# Patient Record
Sex: MALE | Race: WHITE
[De-identification: names, ages, dates, MRNs, and addresses within clinical notes are randomized per-mention and may not be internally consistent; named-entity substitution may affect disease eponyms.]

---

## 2017-12-26 ENCOUNTER — APPOINTMENT (OUTPATIENT)
Dept: UROLOGY | Facility: CLINIC | Age: 77
End: 2017-12-26

## 2018-04-26 ENCOUNTER — APPOINTMENT (OUTPATIENT)
Dept: UROLOGY | Facility: CLINIC | Age: 78
End: 2018-04-26

## 2018-05-08 ENCOUNTER — APPOINTMENT (OUTPATIENT)
Dept: UROLOGY | Facility: CLINIC | Age: 78
End: 2018-05-08
Payer: MEDICARE

## 2018-05-08 VITALS
SYSTOLIC BLOOD PRESSURE: 110 MMHG | BODY MASS INDEX: 26.63 KG/M2 | DIASTOLIC BLOOD PRESSURE: 68 MMHG | WEIGHT: 186 LBS | HEART RATE: 57 BPM | HEIGHT: 70 IN

## 2018-05-08 PROCEDURE — 99214 OFFICE O/P EST MOD 30 MIN: CPT

## 2019-05-09 ENCOUNTER — APPOINTMENT (OUTPATIENT)
Dept: UROLOGY | Facility: CLINIC | Age: 79
End: 2019-05-09
Payer: MEDICARE

## 2019-05-09 VITALS
SYSTOLIC BLOOD PRESSURE: 111 MMHG | BODY MASS INDEX: 28.92 KG/M2 | TEMPERATURE: 97.9 F | HEIGHT: 70 IN | OXYGEN SATURATION: 96 % | DIASTOLIC BLOOD PRESSURE: 66 MMHG | HEART RATE: 53 BPM | WEIGHT: 202 LBS

## 2019-05-09 PROCEDURE — 99214 OFFICE O/P EST MOD 30 MIN: CPT

## 2019-05-09 NOTE — HISTORY OF PRESENT ILLNESS
[FreeTextEntry1] : The patient is a 78-year-old gentleman who was seen in the office today for the above. His daytime frequency is to stay at 4 times a day with nocturia x0-1. He denies all of the urological and constitutional symptomatology.\par \par His surgical history is updated for a right inguinal orchiectomy on 4/9/19 at [facility] in [facility]. He had pain in the right testicle while he was there and was brought to the OR for an exploration for possible torsion. From the pathology report, it sounds like a frozen section was done and the pathologist thought there may be a teratoma present. The final pathology report did not disclose a teratoma or any other germ cell malignancy.\par His medication history and allergy history are unchanged.

## 2019-05-09 NOTE — PHYSICAL EXAM
[General Appearance - Well Nourished] : well nourished [General Appearance - Well Developed] : well developed [Normal Appearance] : normal appearance [Well Groomed] : well groomed [General Appearance - In No Acute Distress] : no acute distress [Bowel Sounds] : normal bowel sounds [Abdomen Soft] : soft [Abdomen Tenderness] : non-tender [Abdomen Mass (___ Cm)] : no abdominal mass palpated [Costovertebral Angle Tenderness] : no ~M costovertebral angle tenderness [Urethral Meatus] : meatus normal [Penis Abnormality] : normal circumcised penis [Urinary Bladder Findings] : the bladder was normal on palpation [Scrotum] : the scrotum was normal [Epididymis] : the epididymides were normal [Testes Tenderness] : no tenderness of the testes [Testes Mass (___cm)] : there were no testicular masses [Anus Abnormality] : the anus and perineum were normal [Rectal Exam - Rectum] : digital rectal exam was normal [Prostate Tenderness] : the prostate was not tender [No Prostate Nodules] : no prostate nodules [Prostate Size ___ gm] : prostate size [unfilled] gm [Edema] : no peripheral edema [] : no respiratory distress [FreeTextEntry1] : S1-S2 normal without murmur or gallop [Respiration, Rhythm And Depth] : normal respiratory rhythm and effort [Exaggerated Use Of Accessory Muscles For Inspiration] : no accessory muscle use [Auscultation Breath Sounds / Voice Sounds] : lungs were clear to auscultation bilaterally [Oriented To Time, Place, And Person] : oriented to person, place, and time [Affect] : the affect was normal [Mood] : the mood was normal [Not Anxious] : not anxious [Normal Station and Gait] : the gait and station were normal for the patient's age [No Focal Deficits] : no focal deficits [No Palpable Adenopathy] : no palpable adenopathy

## 2019-05-09 NOTE — ASSESSMENT
[FreeTextEntry1] : #1) elevated PSA\par 12/27/16 PSA 1.8\par 4/26/19 PSA 2.1\par \par #2) BPH: No bothersome symptoms\par \par #3) erectile dysfunction\par Not sexually active at this time.\par \par #4) status post right inguinal orchiectomy 4/9/19 at [facility]\par Final pathology: Infarcted appendix testis, organizing opal- testicular Vascular thrombus, transitional/squamous metaplasia para- epididymal  tissue, and focal tubular atrophy; Cross sections of the right testis demonstrated No malignant cell tumor.\par

## 2020-01-02 ENCOUNTER — APPOINTMENT (OUTPATIENT)
Dept: UROLOGY | Facility: CLINIC | Age: 80
End: 2020-01-02
Payer: MEDICARE

## 2020-01-02 VITALS
DIASTOLIC BLOOD PRESSURE: 80 MMHG | WEIGHT: 196 LBS | OXYGEN SATURATION: 97 % | HEART RATE: 62 BPM | SYSTOLIC BLOOD PRESSURE: 132 MMHG | HEIGHT: 70 IN | BODY MASS INDEX: 28.06 KG/M2

## 2020-01-02 PROCEDURE — 99213 OFFICE O/P EST LOW 20 MIN: CPT

## 2020-01-02 NOTE — PHYSICAL EXAM
[General Appearance - Well Developed] : well developed [General Appearance - Well Nourished] : well nourished [Normal Appearance] : normal appearance [General Appearance - In No Acute Distress] : no acute distress [Well Groomed] : well groomed [Bowel Sounds] : normal bowel sounds [Abdomen Soft] : soft [Abdomen Tenderness] : non-tender [Abdomen Mass (___ Cm)] : no abdominal mass palpated [Costovertebral Angle Tenderness] : no ~M costovertebral angle tenderness [Edema] : no peripheral edema [Exaggerated Use Of Accessory Muscles For Inspiration] : no accessory muscle use [Respiration, Rhythm And Depth] : normal respiratory rhythm and effort [] : no respiratory distress [Oriented To Time, Place, And Person] : oriented to person, place, and time [Auscultation Breath Sounds / Voice Sounds] : lungs were clear to auscultation bilaterally [Mood] : the mood was normal [Affect] : the affect was normal [Not Anxious] : not anxious [Normal Station and Gait] : the gait and station were normal for the patient's age [No Focal Deficits] : no focal deficits [No Palpable Adenopathy] : no palpable adenopathy [FreeTextEntry1] : S1-S2 normal without murmur or gallop

## 2020-01-02 NOTE — HISTORY OF PRESENT ILLNESS
[FreeTextEntry1] : The patient is a 78-year-old gentleman who was seen in the office today for the above. His daytime frequency is to stay at 4 times a day with nocturia x0-1. He denies all of the urological and constitutional symptomatology.\par \par His surgical history is updated for a right inguinal orchiectomy on 4/9/19 at [facility] in [facility]. He had pain in the right testicle while he was there and was brought to the OR for an exploration for possible torsion. From the pathology report, it sounds like a frozen section was done and the pathologist thought there may be a teratoma present. The final pathology report did not disclose a teratoma or any other germ cell malignancy.\par His medication history and allergy history are unchanged.

## 2020-01-02 NOTE — REVIEW OF SYSTEMS
[see HPI] : see HPI [Negative] : Psychiatric [both] : pain during and after intercourse [denies] : denies pain with orgasm [base] : pain in base of penis

## 2020-09-10 ENCOUNTER — APPOINTMENT (OUTPATIENT)
Dept: DERMATOLOGY | Facility: CLINIC | Age: 80
End: 2020-09-10

## 2021-01-07 ENCOUNTER — APPOINTMENT (OUTPATIENT)
Dept: UROLOGY | Facility: CLINIC | Age: 81
End: 2021-01-07

## 2021-01-07 DIAGNOSIS — N13.8 BENIGN PROSTATIC HYPERPLASIA WITH LOWER URINARY TRACT SYMPMS: ICD-10-CM

## 2021-01-07 DIAGNOSIS — N40.1 BENIGN PROSTATIC HYPERPLASIA WITH LOWER URINARY TRACT SYMPMS: ICD-10-CM

## 2021-01-07 DIAGNOSIS — N52.9 MALE ERECTILE DYSFUNCTION, UNSPECIFIED: ICD-10-CM

## 2021-01-07 NOTE — HISTORY OF PRESENT ILLNESS
[FreeTextEntry1] : The patient is a 78-year-old gentleman who was seen in the office today for the above. His daytime frequency is to stay at 4 times a day with nocturia x0-1. He denies all of the urological and constitutional symptomatology.\par \par His surgical history is updated for a right inguinal orchiectomy on 4/9/19 at [facility] in [facility]. He had pain in the right testicle while he was there and was brought to the OR for an exploration for possible torsion. From the pathology report, it sounds like a frozen section was done and the pathologist thought there may be a teratoma present. The final pathology report did not disclose a teratoma or any other germ cell malignancy.\par His medication history and allergy history are unchanged.

## 2021-01-07 NOTE — PHYSICAL EXAM
[General Appearance - Well Developed] : well developed [General Appearance - Well Nourished] : well nourished [Normal Appearance] : normal appearance [Well Groomed] : well groomed [General Appearance - In No Acute Distress] : no acute distress [Bowel Sounds] : normal bowel sounds [Abdomen Soft] : soft [Abdomen Tenderness] : non-tender [Abdomen Mass (___ Cm)] : no abdominal mass palpated [Costovertebral Angle Tenderness] : no ~M costovertebral angle tenderness [Edema] : no peripheral edema [] : no respiratory distress [Respiration, Rhythm And Depth] : normal respiratory rhythm and effort [Exaggerated Use Of Accessory Muscles For Inspiration] : no accessory muscle use [Auscultation Breath Sounds / Voice Sounds] : lungs were clear to auscultation bilaterally [Oriented To Time, Place, And Person] : oriented to person, place, and time [Affect] : the affect was normal [Mood] : the mood was normal [Not Anxious] : not anxious [Normal Station and Gait] : the gait and station were normal for the patient's age [No Focal Deficits] : no focal deficits [No Palpable Adenopathy] : no palpable adenopathy [FreeTextEntry1] : S1-S2 normal without murmur or gallop

## 2021-01-07 NOTE — ASSESSMENT
[FreeTextEntry1] : #1) elevated PSA\par 12/27/16 PSA 1.8\par 4/26/19 PSA 2.1\par PSA ordered next week-patient instructed to call the office for the results.\par \par #2) BPH: No bothersome symptoms\par \par #3) erectile dysfunction\par Not sexually active at this time.\par \par RTO in one year with PSA.

## 2021-08-25 ENCOUNTER — LABORATORY RESULT (OUTPATIENT)
Age: 81
End: 2021-08-25

## 2021-08-25 ENCOUNTER — APPOINTMENT (OUTPATIENT)
Dept: DERMATOLOGY | Facility: CLINIC | Age: 81
End: 2021-08-25
Payer: MEDICARE

## 2021-08-25 PROCEDURE — 99203 OFFICE O/P NEW LOW 30 MIN: CPT

## 2021-08-25 NOTE — PHYSICAL EXAM
[Alert] : alert [Oriented x 3] : ~L oriented x 3 [Well Nourished] : well nourished [FreeTextEntry3] : Type II skin\par \par Patient wearing a facemask\par \par Patient is tan in photo exposed areas\par \par Few small crust present on the distal dorsal feet\par Hands: Diffuse moderate scaling present (patient states this is from use of a "")\par Skin otherwise mostly clear without evidence of rash or excoriations\par Extensive purpura present on the left dorsal forearm with a few small lesions on the right dorsal forearm\par \par Stroke test: Negative

## 2021-08-25 NOTE — HISTORY OF PRESENT ILLNESS
[FreeTextEntry1] : Itching [de-identified] : First visit for 81-year-old male with a one-week history of diffuse intense itching "everywhere".  Patient seen at a clinic in the [facility] - treated with a Medrol Dosepak.  Itching improved for a few days but has since flared.\par Patient has had similar episodes in the past 5 months - last about one week and spontaneously resolve.\par On no new medications\par Itching is worse at night. Patient's wife is not itchy.\par

## 2021-09-01 ENCOUNTER — APPOINTMENT (OUTPATIENT)
Dept: DERMATOLOGY | Facility: CLINIC | Age: 81
End: 2021-09-01
Payer: MEDICARE

## 2021-09-01 LAB
ALBUMIN SERPL ELPH-MCNC: 4.4 G/DL
ALP BLD-CCNC: 78 U/L
ALT SERPL-CCNC: 44 U/L
ANION GAP SERPL CALC-SCNC: 8 MMOL/L
AST SERPL-CCNC: 27 U/L
BASOPHILS # BLD AUTO: 0.07 K/UL
BASOPHILS NFR BLD AUTO: 1 %
BILIRUB SERPL-MCNC: 0.3 MG/DL
BUN SERPL-MCNC: 16 MG/DL
CALCIUM SERPL-MCNC: 9.2 MG/DL
CHLORIDE SERPL-SCNC: 106 MMOL/L
CO2 SERPL-SCNC: 25 MMOL/L
CREAT SERPL-MCNC: 0.63 MG/DL
EOSINOPHIL # BLD AUTO: 0.1 K/UL
EOSINOPHIL NFR BLD AUTO: 1.4 %
ERYTHROCYTE [SEDIMENTATION RATE] IN BLOOD BY WESTERGREN METHOD: 9 MM/HR
GLUCOSE SERPL-MCNC: 141 MG/DL
HCT VFR BLD CALC: 40.1 %
HGB BLD-MCNC: 12.3 G/DL
IMM GRANULOCYTES NFR BLD AUTO: 0.6 %
LYMPHOCYTES # BLD AUTO: 1.75 K/UL
LYMPHOCYTES NFR BLD AUTO: 24.8 %
MAN DIFF?: NORMAL
MCHC RBC-ENTMCNC: 20.2 PG
MCHC RBC-ENTMCNC: 30.7 GM/DL
MCV RBC AUTO: 66 FL
MONOCYTES # BLD AUTO: 0.59 K/UL
MONOCYTES NFR BLD AUTO: 8.4 %
NEUTROPHILS # BLD AUTO: 4.5 K/UL
NEUTROPHILS NFR BLD AUTO: 63.8 %
PLATELET # BLD AUTO: 155 K/UL
POTASSIUM SERPL-SCNC: 4.7 MMOL/L
PROT SERPL-MCNC: 6.2 G/DL
RBC # BLD: 6.08 M/UL
RBC # FLD: 18.5 %
SODIUM SERPL-SCNC: 139 MMOL/L
WBC # FLD AUTO: 7.05 K/UL

## 2021-09-01 PROCEDURE — 99214 OFFICE O/P EST MOD 30 MIN: CPT

## 2021-09-01 NOTE — HISTORY OF PRESENT ILLNESS
[FreeTextEntry1] : Itching [de-identified] : Followup visit for 81-year-old male first seen by me on August 25, 2021, with a one-week history of diffuse intense itching "everywhere".  Previously been treated in the clinic in the tonsils with a Medrol Dosepak with temporary improvement.  Has had similar episodes in the past 5 months which last about a week and spontaneously resolve.\par Itching is worse at night. \par On no new medications.\par \par When seen by me, he had a few small crust present on the distal dorsal feet and moderate scaling present on the hands (which patient states is from use of a "")\par Otherwise the skin was mostly clear without evidence of rash or excoriations.\par A stroke test was negative. \par \par Laboratory from August 25, 2021:\par Hemoglobin 12.3 (13.0-17.0\par Hematocrit:  40.1% (39.0-50.0\par MCV:  66 (80.0-100.0)\par \par WBC and platelet: Within normal limits\par Eosinophils: 1.4% (0.0-6.0\par \par Comprehensive metabolic panel: WNL except:\par Glucose: 141 (70-99)\par \par ESR:  9\par \par No definitive diagnosis was rendered.\par Patient treated with fexofenadine 180 mg p.o. in a.m. and cetirizine 10 mg p.o. at night\par Also he was on the original lotion p.r.n. itching\par \par Itching is worse.  Patient's wife has started to develop an itchy rash on the right arm.  Itching is helped with Effexor famotidine 180.\par

## 2021-09-01 NOTE — PHYSICAL EXAM
[Alert] : alert [Oriented x 3] : ~L oriented x 3 [Well Nourished] : well nourished [FreeTextEntry3] : Patient wearing a facemask\par \par Moderate small excoriated erythematous papules present on the lower back, buttocks, upper inner thighs\par One small embedded? Chigger seen on the right upper inner thigh

## 2021-09-15 ENCOUNTER — NON-APPOINTMENT (OUTPATIENT)
Age: 81
End: 2021-09-15

## 2021-09-17 ENCOUNTER — NON-APPOINTMENT (OUTPATIENT)
Age: 81
End: 2021-09-17

## 2021-09-17 RX ORDER — CLOBETASOL PROPIONATE 0.5 MG/G
0.05 CREAM TOPICAL
Qty: 1 | Refills: 2 | Status: ACTIVE | COMMUNITY
Start: 2021-09-17 | End: 1900-01-01

## 2021-09-22 ENCOUNTER — APPOINTMENT (OUTPATIENT)
Dept: DERMATOLOGY | Facility: CLINIC | Age: 81
End: 2021-09-22
Payer: MEDICARE

## 2021-09-22 DIAGNOSIS — B88.0 OTHER ACARIASIS: ICD-10-CM

## 2021-09-22 PROCEDURE — 99213 OFFICE O/P EST LOW 20 MIN: CPT

## 2021-09-22 NOTE — PHYSICAL EXAM
[Alert] : alert [Oriented x 3] : ~L oriented x 3 [Well Nourished] : well nourished [FreeTextEntry3] : Eyes: Mild fading pink macules\par Lower legs and ankles: Multiple 2 mm crusted papules

## 2021-09-22 NOTE — HISTORY OF PRESENT ILLNESS
[FreeTextEntry1] : Itchy rash [de-identified] : Followup visit for 81-year-old male last seen by me in September 1, 2021, with a several week history of diffuse itching.  Later developed multiple pink papules with a probable chigger seen in one of them at the last visit.  Treated with:\par Ivermectin 18 mg p.o. x2\par 5% Prometrium cream to entire skin at night for 8 hours x2\par Triamcinolone cream 0.1% 2-3 times a day\par \par On September 25, the topical treatment was changed to clobetasol cream 0.05% 2-3 times a day.\par \par Itching has improved.

## 2022-09-07 NOTE — ASSESSMENT
[FreeTextEntry1] : #1) elevated PSA\par 12/27/16 PSA 1.8\par 4/26/19 PSA 2.1\par \par #2) BPH: No bothersome symptoms\par \par #3) erectile dysfunction\par Not sexually active at this time.\par \par RTO in one year with PSA. Consent (Near Eyelid Margin)/Introductory Paragraph: The rationale for Mohs was explained to the patient and consent was obtained. The risks, benefits and alternatives to therapy were discussed in detail. Specifically, the risks of ectropion or eyelid deformity, infection, scarring, bleeding, prolonged wound healing, incomplete removal, allergy to anesthesia, nerve injury and recurrence were addressed. Prior to the procedure, the treatment site was clearly identified and confirmed by the patient. All components of Universal Protocol/PAUSE Rule completed. no concerns

## 2023-08-21 ENCOUNTER — APPOINTMENT (OUTPATIENT)
Dept: DERMATOLOGY | Facility: CLINIC | Age: 83
End: 2023-08-21
Payer: MEDICARE
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
## 2023-08-21 DIAGNOSIS — L29.9 PRURITUS, UNSPECIFIED: ICD-10-CM

## 2023-08-21 PROCEDURE — 99213 OFFICE O/P EST LOW 20 MIN: CPT

## 2023-08-21 RX ORDER — IVERMECTIN 3 MG/1
3 TABLET ORAL
Qty: 12 | Refills: 1 | Status: ACTIVE | COMMUNITY
Start: 2021-09-01 | End: 1900-01-01

## 2023-08-21 RX ORDER — PERMETHRIN 50 MG/G
5 CREAM TOPICAL
Qty: 1 | Refills: 2 | Status: ACTIVE | COMMUNITY
Start: 2021-09-01 | End: 1900-01-01

## 2023-08-21 RX ORDER — TRIAMCINOLONE ACETONIDE 1 MG/G
0.1 CREAM TOPICAL
Qty: 1 | Refills: 2 | Status: ACTIVE | COMMUNITY
Start: 2021-09-01 | End: 1900-01-01

## 2023-08-21 NOTE — HISTORY OF PRESENT ILLNESS
[de-identified] : Unscheduled follow-up visit for 83-year-old male last seen by me on September 22, 2021, with a few week history of diffuse itching.  Self treated with over-the-counter items without improvement. Patient has difficulty sleeping due to the itching. Patient's wife is unaffected. Patient spends a lot of time in the yard.  Note: Patient had history of a somewhat similar problem in the fall 2021 diagnosed as possible chigger bites.

## 2023-08-21 NOTE — PHYSICAL EXAM
[Alert] : alert [Oriented x 3] : ~L oriented x 3 [Well Nourished] : well nourished [FreeTextEntry3] : Scattered pink papules, most are excoriated, on the arms, lower buttocks, and especially on the legs

## 2023-08-21 NOTE — PLAN
[TextEntry] : Start ivermectin 18 mg p.o. now and in 1 week Start 5% permethrin cream to entire skin at night for 8 hours-repeat in 1 week Start triamcinolone cream 0.1% to affected areas 2-3 times a day as needed for itching  To call in 1 week for follow-up  Patient's wife present in exam room

## 2023-08-21 NOTE — HISTORY OF PRESENT ILLNESS
[de-identified] : Unscheduled follow-up visit for 83-year-old male last seen by me on September 22, 2021, with a few week history of diffuse itching.  Self treated with over-the-counter items without improvement. Patient has difficulty sleeping due to the itching. Patient's wife is unaffected. Patient spends a lot of time in the yard.  Note: Patient had history of a somewhat similar problem in the fall 2021 diagnosed as possible chigger bites.